# Patient Record
Sex: FEMALE | Race: WHITE
[De-identification: names, ages, dates, MRNs, and addresses within clinical notes are randomized per-mention and may not be internally consistent; named-entity substitution may affect disease eponyms.]

---

## 2018-05-06 ENCOUNTER — HOSPITAL ENCOUNTER (OUTPATIENT)
Dept: HOSPITAL 62 - RAD | Age: 43
End: 2018-05-06
Attending: INTERNAL MEDICINE
Payer: COMMERCIAL

## 2018-05-06 DIAGNOSIS — R91.1: Primary | ICD-10-CM

## 2018-05-06 PROCEDURE — 78815 PET IMAGE W/CT SKULL-THIGH: CPT

## 2018-05-06 PROCEDURE — A9552 F18 FDG: HCPCS

## 2018-05-09 NOTE — RADIOLOGY REPORT (SQ)
EXAM DESCRIPTION:  PET CT SKULL/THIGH



COMPLETED DATE/TIME:  5/6/2018 9:45 pm



REASON FOR STUDY:  SOLITARY PULMONARY NODULE R91.1  SOLITARY PULMONARY NODULE



COMPARISON:  None.



RADIONUCLIDE AND DOSE:  10.17 mCi F18 FDG

The route of agent administration: Intravenous



FASTING BLOOD SUGAR:  103 mg/dl



CONTRAST TYPE AND DOSE:  No CT contrast given.



TECHNIQUE:  Blood glucose level was verified.  Above dose of FDG was injected intravenously.  2-D seg
mented attenuation correction images were obtained from the base of the skull to the midthighs.  Nonc
ontrast CT images were obtained for attenuation correction and fusion with emission images.  CT image
s were performed without oral or intravenous contrast and are not sensitive for parenchymal lesions. 
 A series of overlapping emission PET images were obtained.  Images reviewed and manipulated at [facility] work station by the radiologist.  Images stored on PACS.



LIMITATIONS:  None.



FINDINGS:  HEAD AND NECK: No areas of abnormal metabolic activity in the soft tissues of the head and
 neck.

CHEST: No areas of abnormal metabolic activity in the chest.

ABDOMEN AND PELVIS: No areas of abnormal metabolic activity in the abdomen or pelvis.  Expected physi
ologic activity is present in the genitourinary system and bowel.

PROXIMAL LOWER EXTREMITIES: No areas of abnormal metabolic activity in the soft tissues of the lower 
extremities.

BONES: No abnormal metabolic activity in the visualized skeleton.

ADDITIONAL CT FINDINGS: Non hypermetabolic 1.5 x 2.2 cm pulmonary nodule left lower lobe.

OTHER: No other significant findings.



IMPRESSION:  Non hypermetabolic pulmonary nodule left lower lobe.



TECHNICAL DOCUMENTATION:  JOB ID:  [id number]

 2011 [employer]- All Rights Reserved



Reading location - IP/workstation name: [facility]